# Patient Record
Sex: MALE | Race: BLACK OR AFRICAN AMERICAN
[De-identification: names, ages, dates, MRNs, and addresses within clinical notes are randomized per-mention and may not be internally consistent; named-entity substitution may affect disease eponyms.]

---

## 2017-02-04 ENCOUNTER — HOSPITAL ENCOUNTER (EMERGENCY)
Dept: HOSPITAL 17 - NEPC | Age: 47
LOS: 1 days | Discharge: HOME | End: 2017-02-05
Payer: MEDICARE

## 2017-02-04 VITALS
TEMPERATURE: 98.1 F | HEART RATE: 104 BPM | OXYGEN SATURATION: 96 % | RESPIRATION RATE: 18 BRPM | SYSTOLIC BLOOD PRESSURE: 159 MMHG | DIASTOLIC BLOOD PRESSURE: 91 MMHG

## 2017-02-04 VITALS
OXYGEN SATURATION: 97 % | RESPIRATION RATE: 20 BRPM | SYSTOLIC BLOOD PRESSURE: 183 MMHG | DIASTOLIC BLOOD PRESSURE: 96 MMHG | HEART RATE: 101 BPM

## 2017-02-04 VITALS — WEIGHT: 297.62 LBS | HEIGHT: 71 IN | BODY MASS INDEX: 41.67 KG/M2

## 2017-02-04 VITALS — OXYGEN SATURATION: 97 %

## 2017-02-04 DIAGNOSIS — G40.909: Primary | ICD-10-CM

## 2017-02-04 DIAGNOSIS — I10: ICD-10-CM

## 2017-02-04 LAB
AMPHETAMINE, URINE: (no result)
ANION GAP SERPL CALC-SCNC: 12 MEQ/L (ref 5–15)
BARBITURATES, URINE: (no result)
BASOPHILS # BLD AUTO: 0.1 TH/MM3 (ref 0–0.2)
BASOPHILS NFR BLD: 0.7 % (ref 0–2)
BUN SERPL-MCNC: 10 MG/DL (ref 7–18)
CHLORIDE SERPL-SCNC: 102 MEQ/L (ref 98–107)
COCAINE UR-MCNC: (no result) NG/ML
EOSINOPHIL # BLD: 0.1 TH/MM3 (ref 0–0.4)
EOSINOPHIL NFR BLD: 0.9 % (ref 0–4)
ERYTHROCYTE [DISTWIDTH] IN BLOOD BY AUTOMATED COUNT: 14.5 % (ref 11.6–17.2)
GFR SERPLBLD BASED ON 1.73 SQ M-ARVRAT: 118 ML/MIN (ref 89–?)
HCO3 BLD-SCNC: 20.6 MEQ/L (ref 21–32)
HCT VFR BLD CALC: 42.1 % (ref 39–51)
HEMO FLAGS: (no result)
LYMPHOCYTES # BLD AUTO: 3.8 TH/MM3 (ref 1–4.8)
LYMPHOCYTES NFR BLD AUTO: 38.5 % (ref 9–44)
MCH RBC QN AUTO: 26.4 PG (ref 27–34)
MCHC RBC AUTO-ENTMCNC: 34.6 % (ref 32–36)
MCV RBC AUTO: 76.2 FL (ref 80–100)
MONOCYTES NFR BLD: 10.7 % (ref 0–8)
NEUTROPHILS # BLD AUTO: 4.9 TH/MM3 (ref 1.8–7.7)
NEUTROPHILS NFR BLD AUTO: 49.2 % (ref 16–70)
PLATELET # BLD: 204 TH/MM3 (ref 150–450)
POTASSIUM SERPL-SCNC: 3.6 MEQ/L (ref 3.5–5.1)
RBC # BLD AUTO: 5.53 MIL/MM3 (ref 4.5–5.9)
SODIUM SERPL-SCNC: 135 MEQ/L (ref 136–145)
WBC # BLD AUTO: 9.9 TH/MM3 (ref 4–11)

## 2017-02-04 PROCEDURE — 96365 THER/PROPH/DIAG IV INF INIT: CPT

## 2017-02-04 PROCEDURE — 80307 DRUG TEST PRSMV CHEM ANLYZR: CPT

## 2017-02-04 PROCEDURE — 80185 ASSAY OF PHENYTOIN TOTAL: CPT

## 2017-02-04 PROCEDURE — 99284 EMERGENCY DEPT VISIT MOD MDM: CPT

## 2017-02-04 PROCEDURE — 96375 TX/PRO/DX INJ NEW DRUG ADDON: CPT

## 2017-02-04 PROCEDURE — 80320 DRUG SCREEN QUANTALCOHOLS: CPT

## 2017-02-04 PROCEDURE — 70450 CT HEAD/BRAIN W/O DYE: CPT

## 2017-02-04 PROCEDURE — 96361 HYDRATE IV INFUSION ADD-ON: CPT

## 2017-02-04 PROCEDURE — 80048 BASIC METABOLIC PNL TOTAL CA: CPT

## 2017-02-04 PROCEDURE — 85025 COMPLETE CBC W/AUTO DIFF WBC: CPT

## 2017-02-04 PROCEDURE — 80184 ASSAY OF PHENOBARBITAL: CPT

## 2017-02-04 NOTE — RADRPT
EXAM DATE/TIME:  02/04/2017 23:39 

 

[facility] COMPARISON:     

CT BRAIN W/O CONTRAST, January 05, 2016, 21:11.

 

 

INDICATIONS :     

Seizure.

                      

 

RADIATION DOSE:     

50.30 CTDIvol (mGy) 

 

 

 

MEDICAL HISTORY :     

Hypertension. Seizures. 

 

SURGICAL HISTORY :      

None. 

 

ENCOUNTER:      

Initial

 

ACUITY:      

1 day

 

PAIN SCALE:      

0/10

 

LOCATION:        

cranial 

 

TECHNIQUE:     

Multiple contiguous axial images were obtained of the head.  Using automated exposure control and adj
ustment of the mA and/or kV according to patient size, radiation dose was kept as low as reasonably a
chievable to obtain optimal diagnostic quality images. 

 

        FINDINGS:

 

CEREBRUM:     

The ventricles are normal.  No evidence of midline shift, mass lesion, hemorrhage or acute infarction
.  No extra-axial fluid collections are seen.

 

POSTERIOR FOSSA:     

The cerebellum and brainstem demonstrate no abnormality.  The 4th ventricle is midline.  The cerebell
opontine angle is unremarkable.

 

EXTRACRANIAL:     

Visualized sinuses are clear.

 

SKULL:     

The calvaria is intact.  No evidence of skull fracture.

 

CONCLUSION:     

Stable noncontrast head CT. No acute finding is identified.

 

 

 

 Hernán Redd MD on February 04, 2017 at 23:45           

Board Certified Radiologist.

 This report was verified electronically.

## 2017-02-05 VITALS — SYSTOLIC BLOOD PRESSURE: 143 MMHG | DIASTOLIC BLOOD PRESSURE: 80 MMHG

## 2017-02-05 VITALS
SYSTOLIC BLOOD PRESSURE: 172 MMHG | RESPIRATION RATE: 20 BRPM | OXYGEN SATURATION: 99 % | DIASTOLIC BLOOD PRESSURE: 74 MMHG | HEART RATE: 90 BPM

## 2017-02-05 VITALS
OXYGEN SATURATION: 95 % | RESPIRATION RATE: 20 BRPM | DIASTOLIC BLOOD PRESSURE: 86 MMHG | SYSTOLIC BLOOD PRESSURE: 152 MMHG | HEART RATE: 102 BPM

## 2017-02-05 NOTE — PD
HPI


Chief Complaint:  Seizure


Time Seen by Provider:  22:46


Travel History


International Travel<30 days:  No


Contact w/Intl Traveler<30days:  No


Traveled to known affect area:  No





History of Present Illness


HPI


46-year-old male presents status post witnessed generalized tonic-clonic 

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  

Patient here states that he bit his tongue and has history of seizure disorder.

  He denies specific complaints.  He states he takes Dilantin and another 

medication and has been taking them like he should for his seizures.  History 

is initially limited as patient can only give me basic details.





PFSH


Past Medical History


Hx Anticoagulant Therapy:  No


Blood Disorders:  No


Cardiovascular Problems:  No


Chemotherapy:  No


Cerebrovascular Accident:  No


Diabetes:  No


Diminished Hearing:  No


Hypertension:  Yes


Respiratory:  No


Seizures:  Yes


Influenza Vaccination:  No





Past Surgical History


Surgical History:  No Previous Surgery





Social History


Alcohol Use:  No


Tobacco Use:  No


Substance Use:  No





Allergies-Medications


(Allergen,Severity, Reaction):  


Coded Allergies:  


     No Known Allergies (Verified , 2/4/17)


Reported Meds & Prescriptions





Reported Meds & Active Scripts


Active


Reported


[Metoprolol Tartrate ]     


[Dilantin]     








Review of Systems


ROS Limitations:  Poor Historian


Except as stated in HPI:  all other systems reviewed are Neg





Physical Exam


Narrative


GENERAL: Well-nourished, well-developed patient.


SKIN: Warm and dry.


HEAD: Normocephalic and hematoma noted to forehead


EYES: No injection or drainage. 


ENT: No nasal drainage noted.  Simple small laceration noted to right side of 

tongue


NECK: Supple, trachea midline.  Nontender in midline


CARDIOVASCULAR: Regular rate and rhythm 


RESPIRATORY: Breath sounds equal bilaterally. No accessory muscle use.


GASTROINTESTINAL: Abdomen soft, non-tender, nondistended. 


EXTREMITIES: No specific joint pain


NEUROLOGICAL: Awake and alert to name. Motor and sensory grossly within normal 

limits. Normal speech.





Data


Data


Last Documented VS





Vital Signs








  Date Time  Temp Pulse Resp B/P Pulse Ox O2 Delivery O2 Flow Rate FiO2


 


2/5/17 02:17  100 20 143/80 93   


 


2/5/17 00:55      Nasal Cannula  


 


2/4/17 23:15       3 


 


2/4/17 22:48 98.1       








Orders





 Complete Blood Count With Diff (2/4/17 22:47)


Basic Metabolic Panel (Bmp) (2/4/17 22:47)


Alcohol (Ethanol) (2/4/17 22:47)


Phenytoin (Dilantin) (2/4/17 22:47)


Drug Screen, Random Urine (2/4/17 22:47)


Ct Brain W/O Iv Contrast(Rout) (2/4/17 )


Blood Glucose (2/4/17 22:47)


Ecg Monitoring (2/4/17 22:47)


Iv Access Insert/Monitor (2/4/17 22:47)


Oximetry (2/4/17 22:47)


Sodium Chlor 0.9% 1000 Ml Inj (Ns 1000 M (2/4/17 22:47)


Sodium Chloride 0.9% Flush (Ns Flush) (2/4/17 23:00)


Lorazepam Inj (Ativan Inj) (2/4/17 23:00)


Phenobarbital (2/4/17 23:58)


Fosphenytoin Inj (Cerebyx Inj) (2/5/17 00:15)





Labs





 Laboratory Tests








Test 2/4/17





 23:20


 


White Blood Count 9.9 TH/MM3


 


Red Blood Count 5.53 MIL/MM3


 


Hemoglobin 14.6 GM/DL


 


Hematocrit 42.1 %


 


Mean Corpuscular Volume 76.2 FL


 


Mean Corpuscular Hemoglobin 26.4 PG


 


Mean Corpuscular Hemoglobin 34.6 %





Concent 


 


Red Cell Distribution Width 14.5 %


 


Platelet Count 204 TH/MM3


 


Mean Platelet Volume 8.2 FL


 


Neutrophils (%) (Auto) 49.2 %


 


Lymphocytes (%) (Auto) 38.5 %


 


Monocytes (%) (Auto) 10.7 %


 


Eosinophils (%) (Auto) 0.9 %


 


Basophils (%) (Auto) 0.7 %


 


Neutrophils # (Auto) 4.9 TH/MM3


 


Lymphocytes # (Auto) 3.8 TH/MM3


 


Monocytes # (Auto) 1.1 TH/MM3


 


Eosinophils # (Auto) 0.1 TH/MM3


 


Basophils # (Auto) 0.1 TH/MM3


 


CBC Comment DIFF FINAL 


 


Differential Comment  


 


Sodium Level 135 MEQ/L


 


Potassium Level 3.6 MEQ/L


 


Chloride Level 102 MEQ/L


 


Carbon Dioxide Level 20.6 MEQ/L


 


Anion Gap 12 MEQ/L


 


Blood Urea Nitrogen 10 MG/DL


 


Creatinine 0.84 MG/DL


 


Estimat Glomerular Filtration 118 ML/MIN





Rate 


 


Random Glucose 109 MG/DL


 


Calcium Level 8.3 MG/DL


 


Urine Opiates Screen NEG 


 


Urine Barbiturates Screen POS 


 


Phenytoin (Dilantin) Level 3.8 MCG/ML


 


Urine Amphetamines Screen NEG 


 


Urine Benzodiazepines Screen NEG 


 


Urine Cocaine Screen NEG 


 


Urine Cannabinoids Screen NEG 


 


Ethyl Alcohol Level LESS THAN 3





 MG/DL


 


Phenobarbital Level 14.9 MCG/ML











MDM


Medical Decision Making


Medical Screen Exam Complete:  Yes


Emergency Medical Condition:  Yes


Medical Record Reviewed:  Yes (past history confirmed)


Interpretation(s)


CBC & BMP Diagram


2/4/17 23:20








Dilantin level is low


ct brain no acute


Differential Diagnosis


Subtherapeutic seizure medication, hyponatremia, intercranial, hypoglycemia


Narrative Course


Will check blood work, CT brain and reevaluate after one of Ativan to decrease 

seizure threshold





Cerebyx level is low will dose with 1 g of Cerebyx and monitor





0013 before patient could get Cerebyx he had a generalized tonic-clonic seizure 

here





no seizure here after loaded with cerebyx, Patient denies any new complaints 

and states that they are feeling better, all questions answered. Patient knows 

that follow up is incumbent on them and to return to the emergency room 

immediately if new or worsening symptoms develop. Patient given strict return 

precautions, vitals reviewed and are normal, agrees to further workup as an 

outpatient.





Diagnosis





 Primary Impression:  


 Seizure


Referrals:  


Neurologist


call for appointment


monday


Patient Instructions:  General Instructions





***Additional Instructions:


take your seizure medication as directed, your seizure levels were low and you 

were given extra in the ER, return as needed


***Med/Other Pt SpecificInfo:  No Change to Meds


Disposition:  01 DISCHARGE HOME


Condition:  Stable








Monique Baeza MD Feb 5, 2017 01:32

## 2017-02-19 ENCOUNTER — HOSPITAL ENCOUNTER (INPATIENT)
Dept: HOSPITAL 17 - NEPA | Age: 47
LOS: 1 days | Discharge: HOME | DRG: 101 | End: 2017-02-20
Attending: HOSPITALIST | Admitting: HOSPITALIST
Payer: MEDICARE

## 2017-02-19 VITALS
OXYGEN SATURATION: 93 % | SYSTOLIC BLOOD PRESSURE: 143 MMHG | DIASTOLIC BLOOD PRESSURE: 85 MMHG | RESPIRATION RATE: 18 BRPM | TEMPERATURE: 98.7 F | HEART RATE: 95 BPM

## 2017-02-19 VITALS
SYSTOLIC BLOOD PRESSURE: 144 MMHG | HEART RATE: 100 BPM | OXYGEN SATURATION: 100 % | RESPIRATION RATE: 16 BRPM | DIASTOLIC BLOOD PRESSURE: 88 MMHG

## 2017-02-19 VITALS
OXYGEN SATURATION: 100 % | RESPIRATION RATE: 20 BRPM | HEART RATE: 74 BPM | DIASTOLIC BLOOD PRESSURE: 65 MMHG | SYSTOLIC BLOOD PRESSURE: 118 MMHG

## 2017-02-19 VITALS
HEART RATE: 86 BPM | DIASTOLIC BLOOD PRESSURE: 89 MMHG | TEMPERATURE: 98.3 F | RESPIRATION RATE: 22 BRPM | OXYGEN SATURATION: 96 % | SYSTOLIC BLOOD PRESSURE: 188 MMHG

## 2017-02-19 VITALS
RESPIRATION RATE: 18 BRPM | DIASTOLIC BLOOD PRESSURE: 84 MMHG | OXYGEN SATURATION: 98 % | SYSTOLIC BLOOD PRESSURE: 171 MMHG | HEART RATE: 88 BPM | TEMPERATURE: 98.7 F

## 2017-02-19 VITALS — BODY MASS INDEX: 46.14 KG/M2 | WEIGHT: 304.46 LBS | HEIGHT: 68 IN

## 2017-02-19 VITALS — OXYGEN SATURATION: 95 %

## 2017-02-19 DIAGNOSIS — I10: ICD-10-CM

## 2017-02-19 DIAGNOSIS — E66.9: ICD-10-CM

## 2017-02-19 DIAGNOSIS — Z87.891: ICD-10-CM

## 2017-02-19 DIAGNOSIS — E87.6: ICD-10-CM

## 2017-02-19 DIAGNOSIS — G40.419: Primary | ICD-10-CM

## 2017-02-19 DIAGNOSIS — Z91.14: ICD-10-CM

## 2017-02-19 LAB
ALP SERPL-CCNC: 138 U/L (ref 45–117)
ALT SERPL-CCNC: 37 U/L (ref 12–78)
AMPHETAMINE, URINE: (no result)
ANION GAP SERPL CALC-SCNC: 13 MEQ/L (ref 5–15)
AST SERPL-CCNC: 35 U/L (ref 15–37)
BARBITURATES, URINE: (no result)
BASOPHILS # BLD AUTO: 0 TH/MM3 (ref 0–0.2)
BASOPHILS NFR BLD: 0.6 % (ref 0–2)
BILIRUB SERPL-MCNC: 0.3 MG/DL (ref 0.2–1)
BUN SERPL-MCNC: 9 MG/DL (ref 7–18)
CHLORIDE SERPL-SCNC: 104 MEQ/L (ref 98–107)
COCAINE UR-MCNC: (no result) NG/ML
COLOR UR: (no result)
EOSINOPHIL # BLD: 0.2 TH/MM3 (ref 0–0.4)
EOSINOPHIL NFR BLD: 2.2 % (ref 0–4)
ERYTHROCYTE [DISTWIDTH] IN BLOOD BY AUTOMATED COUNT: 14.5 % (ref 11.6–17.2)
GFR SERPLBLD BASED ON 1.73 SQ M-ARVRAT: 108 ML/MIN (ref 89–?)
GLUCOSE UR STRIP-MCNC: (no result) MG/DL
HCO3 BLD-SCNC: 20.6 MEQ/L (ref 21–32)
HCT VFR BLD CALC: 40.7 % (ref 39–51)
HEMO FLAGS: (no result)
HGB UR QL STRIP: (no result)
KETONES UR STRIP-MCNC: (no result) MG/DL
LYMPHOCYTES # BLD AUTO: 1.4 TH/MM3 (ref 1–4.8)
LYMPHOCYTES NFR BLD AUTO: 18.4 % (ref 9–44)
MCH RBC QN AUTO: 26.3 PG (ref 27–34)
MCHC RBC AUTO-ENTMCNC: 34.9 % (ref 32–36)
MCV RBC AUTO: 75.3 FL (ref 80–100)
MONOCYTES NFR BLD: 8.6 % (ref 0–8)
MUCOUS THREADS #/AREA URNS LPF: (no result) /LPF
NEUTROPHILS # BLD AUTO: 5.5 TH/MM3 (ref 1.8–7.7)
NEUTROPHILS NFR BLD AUTO: 70.2 % (ref 16–70)
NITRITE UR QL STRIP: (no result)
PLATELET # BLD: 188 TH/MM3 (ref 150–450)
POTASSIUM SERPL-SCNC: 3.4 MEQ/L (ref 3.5–5.1)
RBC # BLD AUTO: 5.4 MIL/MM3 (ref 4.5–5.9)
SODIUM SERPL-SCNC: 138 MEQ/L (ref 136–145)
SP GR UR STRIP: 1.01 (ref 1–1.03)
SQUAMOUS #/AREA URNS HPF: <1 /HPF (ref 0–5)
TRANS CELLS #/AREA URNS HPF: <1 /HPF
WBC # BLD AUTO: 7.8 TH/MM3 (ref 4–11)

## 2017-02-19 PROCEDURE — 85025 COMPLETE CBC W/AUTO DIFF WBC: CPT

## 2017-02-19 PROCEDURE — 81001 URINALYSIS AUTO W/SCOPE: CPT

## 2017-02-19 PROCEDURE — 83735 ASSAY OF MAGNESIUM: CPT

## 2017-02-19 PROCEDURE — 93005 ELECTROCARDIOGRAM TRACING: CPT

## 2017-02-19 PROCEDURE — 96365 THER/PROPH/DIAG IV INF INIT: CPT

## 2017-02-19 PROCEDURE — 80185 ASSAY OF PHENYTOIN TOTAL: CPT

## 2017-02-19 PROCEDURE — 80184 ASSAY OF PHENOBARBITAL: CPT

## 2017-02-19 PROCEDURE — 80307 DRUG TEST PRSMV CHEM ANLYZR: CPT

## 2017-02-19 PROCEDURE — 96375 TX/PRO/DX INJ NEW DRUG ADDON: CPT

## 2017-02-19 PROCEDURE — 80053 COMPREHEN METABOLIC PANEL: CPT

## 2017-02-19 PROCEDURE — 96361 HYDRATE IV INFUSION ADD-ON: CPT

## 2017-02-19 RX ADMIN — PHENYTOIN SODIUM SCH MG: 50 INJECTION INTRAMUSCULAR; INTRAVENOUS at 22:20

## 2017-02-19 RX ADMIN — METOPROLOL TARTRATE SCH MG: 50 TABLET, FILM COATED ORAL at 20:03

## 2017-02-19 NOTE — PD
HPI


Chief Complaint:  Seizure


Time Seen by Provider:  11:38


Travel History


International Travel<30 days:  No


Contact w/Intl Traveler<30days:  No


Traveled to known affect area:  No





History of Present Illness


HPI


Patient is a 46-year-old male with history of grand mal seizures currently 

taking Dilantin for seizures, presents to emergency room after he had a 

witnessed seizure at [employer] today.  EMS unsure how long seizure lasted or 

whether patient had any trauma to his head.  Patient was post ictal prior on 

route to the emergency room, patient reports that he has been compliant with 

his medications.  Patient reports that he last had a seizure about 3-4 months 

ago and last saw his neurologist about 5 months ago.  Patient reports that when 

he last saw his neurologist, there is no medication changes.  Patient reports 

that he feels fine now, denies headache or dizziness at this time.  Patient 

denies chest pain or shortness of breath.  Patient with no complaints.





PFSH


Past Medical History


Hx Anticoagulant Therapy:  No


Blood Disorders:  No


Cardiovascular Problems:  No


Chemotherapy:  No


Cerebrovascular Accident:  No


Diabetes:  No


Diminished Hearing:  No


Hypertension:  Yes


Respiratory:  No


Seizures:  Yes


Influenza Vaccination:  No





Past Surgical History


Surgical History:  No Previous Surgery





Family History


Family History:  Negative





Social History


Alcohol Use:  No


Tobacco Use:  No


Substance Use:  No





Allergies-Medications


(Allergen,Severity, Reaction):  


Coded Allergies:  


     No Known Allergies (Verified , 2/19/17)


Reported Meds & Prescriptions





Reported Meds & Active Scripts


Active


Reported


[Metoprolol Tartrate ]     


[Dilantin]     








Review of Systems


General / Constitutional:  No: Fever


Eyes:  No: Visual changes


HENT:  No: Headaches


Cardiovascular:  No: Chest Pain or Discomfort


Respiratory:  No: Shortness of Breath


Gastrointestinal:  No: Abdominal Pain


Genitourinary:  No: Dysuria


Musculoskeletal:  No: Pain


Skin:  No Rash


Neurologic:  Positive: Seizures,  No: Weakness


Psychiatric:  No: Depression


Endocrine:  No: Polydipsia


Hematologic/Lymphatic:  No: Easy Bruising





Physical Exam


Narrative


GENERAL: No acute distress, nontoxic


SKIN: Warm and dry.


HEAD: Atraumatic. Normocephalic. 


EYES: Pupils equal and round. No scleral icterus. No injection or drainage. 


ENT: No nasal bleeding or discharge.  Mucous membranes pink and moist.


NECK: Trachea midline. No JVD. 


CARDIOVASCULAR: Regular rate and rhythm.  No murmur appreciated.


RESPIRATORY: No accessory muscle use. Clear to auscultation. Breath sounds 

equal bilaterally. 


GASTROINTESTINAL: Abdomen soft, non-tender, nondistended. Hepatic and splenic 

margins not palpable. 


MUSCULOSKELETAL: No obvious deformities. No clubbing.  No cyanosis.  No edema. 


NEUROLOGICAL: Awake and alert. No obvious cranial nerve deficits.  Motor 

grossly within normal limits. Normal speech.  Cranial nerves to 12 grossly 

intact without any obvious neurological deficits


PSYCHIATRIC: Appropriate mood and affect; insight and judgment normal.





Data


Data


Last Documented VS





Vital Signs








  Date Time  Temp Pulse Resp B/P Pulse Ox O2 Delivery O2 Flow Rate FiO2


 


2/19/17 13:13  100 16 144/88 100 Partial Rebreather  


 


2/19/17 13:12       15 


 


2/19/17 11:27 98.7       








Orders





 Electrocardiogram (2/19/17 )


Complete Blood Count With Diff (2/19/17 11:44)


Phenytoin (Dilantin) (2/19/17 11:44)


Drug Screen, Random Urine (2/19/17 11:44)


Ecg Monitoring (2/19/17 11:44)


Iv Access Insert/Monitor (2/19/17 11:44)


Oximetry (2/19/17 11:44)


Comprehensive Metabolic Panel (2/19/17 11:44)


Sodium Chlor 0.9% 1000 Ml Inj (Ns 1000 M (2/19/17 11:44)


Sodium Chloride 0.9% Flush (Ns Flush) (2/19/17 11:45)


Ua Includes Microscopic (2/19/17 11:44)


Phenytoin Inj (Dilantin Inj) (2/19/17 12:45)


Lorazepam Inj (Ativan Inj) (2/19/17 12:58)


Lorazepam Inj (Ativan Inj) (2/19/17 13:15)





Labs





 Laboratory Tests








Test 2/19/17 2/19/17





 11:49 12:37


 


White Blood Count 7.8 TH/MM3 


 


Red Blood Count 5.40 MIL/MM3 


 


Hemoglobin 14.2 GM/DL 


 


Hematocrit 40.7 % 


 


Mean Corpuscular Volume 75.3 FL 


 


Mean Corpuscular Hemoglobin 26.3 PG 


 


Mean Corpuscular Hemoglobin 34.9 % 





Concent  


 


Red Cell Distribution Width 14.5 % 


 


Platelet Count 188 TH/MM3 


 


Mean Platelet Volume 7.8 FL 


 


Neutrophils (%) (Auto) 70.2 % 


 


Lymphocytes (%) (Auto) 18.4 % 


 


Monocytes (%) (Auto) 8.6 % 


 


Eosinophils (%) (Auto) 2.2 % 


 


Basophils (%) (Auto) 0.6 % 


 


Neutrophils # (Auto) 5.5 TH/MM3 


 


Lymphocytes # (Auto) 1.4 TH/MM3 


 


Monocytes # (Auto) 0.7 TH/MM3 


 


Eosinophils # (Auto) 0.2 TH/MM3 


 


Basophils # (Auto) 0.0 TH/MM3 


 


CBC Comment DIFF FINAL  


 


Differential Comment   


 


Sodium Level 138 MEQ/L 


 


Potassium Level 3.4 MEQ/L 


 


Chloride Level 104 MEQ/L 


 


Carbon Dioxide Level 20.6 MEQ/L 


 


Anion Gap 13 MEQ/L 


 


Blood Urea Nitrogen 9 MG/DL 


 


Creatinine 0.91 MG/DL 


 


Estimat Glomerular Filtration 108 ML/MIN 





Rate  


 


Random Glucose 113 MG/DL 


 


Calcium Level 8.3 MG/DL 


 


Total Bilirubin 0.3 MG/DL 


 


Aspartate Amino Transf 35 U/L 





(AST/SGOT)  


 


Alanine Aminotransferase 37 U/L 





(ALT/SGPT)  


 


Alkaline Phosphatase 138 U/L 


 


Total Protein 8.9 GM/DL 


 


Albumin 3.4 GM/DL 


 


Phenytoin (Dilantin) Level 5.8 MCG/ML 


 


Urine Color  LIGHT-YELLOW 


 


Urine Turbidity  CLEAR 


 


Urine pH  6.5 


 


Urine Specific Gravity  1.011 


 


Urine Protein  30 mg/dL


 


Urine Glucose (UA)  NEG mg/dL


 


Urine Ketones  NEG mg/dL


 


Urine Occult Blood  TRACE 


 


Urine Nitrite  NEG 


 


Urine Bilirubin  NEG 


 


Urine Urobilinogen  LESS THAN 2.0





  MG/DL


 


Urine Leukocyte Esterase  NEG 


 


Urine RBC  LESS THAN 1





  /hpf


 


Urine WBC  LESS THAN 1





  /hpf


 


Urine Squamous Epithelial  <1 /hpf





Cells  


 


Urine Transitional Epithelial  <1 /hpf





Cells  


 


Urine Mucus  FEW /lpf











MDM


Medical Decision Making


Medical Screen Exam Complete:  Yes


Emergency Medical Condition:  Yes


Interpretation(s)


EKG at 1136: Normal sinus rhythm at 80 beats for minute, QT//411, 

nonspecific T-wave changes








Vital Signs








  Date Time  Temp Pulse Resp B/P Pulse Ox O2 Delivery O2 Flow Rate FiO2


 


2/19/17 11:27 98.7 95 18 143/85 93   








Differential Diagnosis


Recurrent seizures, medication noncompliance, electrolyte abnormalities, ACS, 

arrhythmia


Narrative Course


Patient is a 46-year-old male with history of grand mal seizures, presents to 

emergency room with complaints of recurrent seizures.  Patient is currently on 

Dilantin for seizures, reports last seizure was 3-4 months ago.  Patient 

currently with no complaints, had a witnessed seizure while at [employer] today.





Plan to monitor patient, obtain labs and Dilantin level.  Patient currently on 

a cardiac monitor, will continue to monitor him.  Patient does have history of 

medication noncompliance, if the dilantin level is low, we will bolus him with 

Dilantin.





Was called to room as patient began to have a grand mal seizure, seizure lasted 

for less than 1 minute, 2mg of IV ativan given to patient, patient postictal. 

patient with second seizure today, will require observation





case reviewed with dr mcdaniels who accepts pt to service





Diagnosis





 Primary Impression:  


 Recurrent seizures





Admitting Information


Admitting Physician Requests:  Observation








Ronda Conley DO Feb 19, 2017 11:53

## 2017-02-19 NOTE — HHI.HP
__________________________________________________





[facility]


Service


[facility]


Primary Care Physician


No Primary Care Physician


Admission Diagnosis


Recurrent Seizure


Diagnoses:  


Chief Complaint:  


recurrent seizure


Travel History


International Travel<30 Days:  No


Contact w/Intl Traveler <30 Da:  No


Traveled to Known Affected Are:  No


History of Present Illness


46-year-old male with PMH of hypertension, grand mal seizures on Dilantin/

Phenobarbital, and hx of status epilepticus requiring intubation, presents to 

the ER after a witnessed seizure while at [employer] today . It is unknown how 

long the seizure lasted. The patient was post ictal en route to the ER however 

AAOx4 upon arrival. He reports urinary incontinence and tongue biting. He last 

visited the ER on 17 for seizure x2, head CT was unremarkable at that time, 

he was loaded with IV Cerebyx and discharged home. The patient reports 

compliance with his Dilantin however upon arrival Dilantin level subtherapeutic 

at 5.8. He does admit to running out of his Phenobarbital 2-3 days ago; states 

a prescription was called in but he has not picked it up yet. He last saw his 

neurologist 5months ago, he is unsure who his neurologist is (female, located 

on [facility]). The patient was initially feeling back to normal in the ER 

however then had another grand mal seizure witnessed by ER MD, lasted less than 

1 minute, s/p 2mg IV Ativan. The patient is again post ictal, drowsy, falls 

asleep throughout exam however is oriented to person, place, and states the 

date is 17. With intractable seizures and subtherapeutic levels, ER MD 

requested admission. The patient denies any recent fevers/chills, headaches, 

lightheadedness, dizziness, chest pains, palpitations, shortness of breath, 

abdominal or urinary complaints.





Review of Systems


Constitutional:  DENIES: Diaphoretic episodes, Fever, Chills, Dizziness


Endocrine:  DENIES: Polydipsia, Polyuria, Polyphagia


Eyes:  DENIES: Blurred vision, Vision loss, Double Vision


Ears, nose, mouth, throat:  DENIES: Throat pain, Hoarseness, Odynophagia


Respiratory:  DENIES: Cough, Shortness of breath


Cardiovascular:  DENIES: Chest pain, Palpitations, Dyspnea on Exertion


Gastrointestinal:  DENIES: Abdominal pain, Diarrhea, Nausea, Vomiting


Genitourinary:  COMPLAINS OF: Urinary incontinence,  DENIES: Urgency, Dysuria


Musculoskeletal:  DENIES: Back pain, Neck pain


Integumentary:  DENIES: Pruritus, Rash


Hematologic/lymphatic:  DENIES: Bruising, Lymphadenopathy


Immunologic/allergic:  DENIES: Eczema, Urticaria


Neurologic:  COMPLAINS OF: Seizures,  DENIES: Abnormal gait, Headache, 

Localized weakness, Paresthesias


Psychiatric:  DENIES: Anxiety, Depression





Past Family Social History


Past Medical History


hypertension


grand mal seizures


hx of status epilepticus requiring intubation


Past Surgical History


Denies any prior surgeries.


Reported Medications


Metoprolol (unknown dose)


Dilantin (patient believes 100mg bid)


Phenobarbital (patient also believes this is 100mg bid)


Asked RN to update med rec prior to restarting home meds.


Allergies:  


Coded Allergies:  


     No Known Allergies (Verified , 17)


Active Ordered Medications





 Current Medications








 Medications


  (Trade)  Dose


 Ordered  Sig/Justus


 Route  Start Time


 Stop Time Status Last Admin


 


  (NS Flush)  2 ml  UNSCH  PRN


 IVF  17 14:30


     


 


 


  (NS Flush)  2 ml  BID


 IVF  17 21:00


     


 


 


  (Ativan Inj)  2 mg  Q10M  PRN


 IV  17 14:30


     


 


 


  (Tylenol)  650 mg  Q4H  PRN


 PO  17 14:30


     


 


 


 Phenytoin Sodium


 100 mg  100 mg  Q8HR


 IV  17 22:00


     


 


 


  (KCl 20 Meq


 Premix Inj)  100 ml @ 


 50 mls/hr  BOLUS  ONCE


 IV  17 15:00


 17 16:59  17 15:04


 


 


  (Luminal Inj)  130 mg  ONCE  ONCE


 IM  17 15:30


 17 15:31 UNV  


 








Family History


Both parents are , unknown medical problems


Denies any family history of seizures


Social History


Smoked tobacco 1PPD for 10+ years, quit in his late 30s


Denies any alcohol or illicit drug use





Physical Exam


Vital Signs





 Vital Signs








  Date Time  Temp Pulse Resp B/P Pulse Ox O2 Delivery O2 Flow Rate FiO2


 


17 13:13  100 16 144/88 100 Partial Rebreather  


 


17 13:12     100 Non-Rebreather 15 


 


17 11:46     95 Nasal Cannula 2 


 


17 11:27 98.7 95 18 143/85 93   








Physical Exam


GENERAL: Well-nourished, well-developed obese AA male patient in NAD. Drowsy, 

however easily awakens to voice, is AAOx4. 


SKIN: Warm and dry. No rash.


HEAD:  Normocephalic. Atraumatic.


EYES: Pupils equal and round. No scleral icterus. No injection or drainage. 


ENT: No nasal bleeding or discharge.  Mucous membranes pink and moist. Tongue 

with bite marks/abrasion/ecchymosis on both sides, no active bleeding. 


NECK: Supple. Trachea midline. 


CARDIOVASCULAR: Regular rate and rhythm.  S1, S2 noted. No murmur appreciated. 


RESPIRATORY: No accessory muscle use. Clear to auscultation. Breath sounds 

equal bilaterally.  


GASTROINTESTINAL: Abdomen soft, non-tender, nondistended. Normoactive bowel 

sounds x4.


MUSCULOSKELETAL: No obvious deformities. Extremities without clubbing, cyanosis

, or edema. 


NEUROLOGICAL: Awake and alert. No obvious cranial nerve deficits.  Motor 

grossly within normal limits. Moves all extremities spontaneously.  Normal 

speech.


PSYCHIATRIC: Appropriate mood and affect; insight and judgment normal.


Laboratory





Laboratory Tests








Test 17





 11:49 12:37


 


White Blood Count 7.8  


 


Red Blood Count 5.40  


 


Hemoglobin 14.2  


 


Hematocrit 40.7  


 


Mean Corpuscular Volume 75.3  


 


Mean Corpuscular Hemoglobin 26.3  


 


Mean Corpuscular Hemoglobin 34.9  





Concent  


 


Red Cell Distribution Width 14.5  


 


Platelet Count 188  


 


Mean Platelet Volume 7.8  


 


Neutrophils (%) (Auto) 70.2  


 


Lymphocytes (%) (Auto) 18.4  


 


Monocytes (%) (Auto) 8.6  


 


Eosinophils (%) (Auto) 2.2  


 


Basophils (%) (Auto) 0.6  


 


Neutrophils # (Auto) 5.5  


 


Lymphocytes # (Auto) 1.4  


 


Monocytes # (Auto) 0.7  


 


Eosinophils # (Auto) 0.2  


 


Basophils # (Auto) 0.0  


 


CBC Comment DIFF FINAL  


 


Differential Comment   


 


Sodium Level 138  


 


Potassium Level 3.4  


 


Chloride Level 104  


 


Carbon Dioxide Level 20.6  


 


Anion Gap 13  


 


Blood Urea Nitrogen 9  


 


Creatinine 0.91  


 


Estimat Glomerular Filtration 108  





Rate  


 


Random Glucose 113  


 


Calcium Level 8.3  


 


Total Bilirubin 0.3  


 


Aspartate Amino Transf 35  





(AST/SGOT)  


 


Alanine Aminotransferase 37  





(ALT/SGPT)  


 


Alkaline Phosphatase 138  


 


Total Protein 8.9  


 


Albumin 3.4  


 


Phenytoin (Dilantin) Level 5.8  


 


Urine Color  LIGHT-YELLOW 


 


Urine Turbidity  CLEAR 


 


Urine pH  6.5 


 


Urine Specific Gravity  1.011 


 


Urine Protein  30 


 


Urine Glucose (UA)  NEG 


 


Urine Ketones  NEG 


 


Urine Occult Blood  TRACE 


 


Urine Nitrite  NEG 


 


Urine Bilirubin  NEG 


 


Urine Urobilinogen  LESS THAN 2.0 


 


Urine Leukocyte Esterase  NEG 


 


Urine RBC  LESS THAN 1 


 


Urine WBC  LESS THAN 1 


 


Urine Squamous Epithelial  <1 





Cells  


 


Urine Transitional Epithelial  <1 





Cells  


 


Urine Mucus  FEW 


 


Urine Opiates Screen  NEG 


 


Urine Barbiturates Screen  POS 


 


Urine Amphetamines Screen  NEG 


 


Urine Benzodiazepines Screen  NEG 


 


Urine Cocaine Screen  NEG 


 


Urine Cannabinoids Screen  NEG 








Result Diagram:  


17 1149                                                                   

             17 1149





Imaging


17 - Head CT images reviewed, unremarkable.





Assessment and Plan


Problem List:  


(1) Recurrent seizures


ICD Code:  G40.909


Status:  Acute


Assessment and Plan


46-year-old male with PMH of hypertension, grand mal seizures on Dilantin/

Phenobarbital, and hx of status epilepticus requiring intubation, presents to 

the ER after a witnessed seizure while at [employer] today  and again another 

grand mal seizure witnessed in the ER. 





Recurrent Grand Mal Seizures: Failed Outpatient, seen in the ER 17 for 

seizure x2, loaded with IV Cerebyx, Head CT unremarkable at that time, he was 

discharged home. Recurrent seizure likely secondary to subtherapeutic Dilantin 

level 5.8 and ran out of phenobarbital 2-3days ago. Started on IV Dilantin 

100mg q8h. Loaded with phenobarbital 130mg IM x1.  IV Ativan 2mg prn seizure. 

Seizure precautions. Repeat dilantin and phenobarbital level in the am. 

Outpatient f/up with neurologist. 





Hypertension: chronic, on metoprolol however dose unknown, asked RN to update 

med rec. Monitor BP, adjust antihypertensives as needed. 





Hypokalemia: K 3.4, given IV KCl 20meq replacement. Check mag. Repeat BMP 

tomorrow. 





DVT Prophylaxis: teds/SCDs





Written by Fifi Ng, acting as scribe for Dr. Monae on 17 at 15:

25. 


The documentation accurately reflects the work performed face-to-face by me on  at 15:25


Code Status


Full Code


Discussed Condition With


Patient, ER Fifi Vigil PA-C 2017 14:55


Jose Alejandro Monae MD 2017 18:32

## 2017-02-20 VITALS
SYSTOLIC BLOOD PRESSURE: 153 MMHG | OXYGEN SATURATION: 95 % | RESPIRATION RATE: 20 BRPM | TEMPERATURE: 98.8 F | DIASTOLIC BLOOD PRESSURE: 86 MMHG | HEART RATE: 78 BPM

## 2017-02-20 VITALS
SYSTOLIC BLOOD PRESSURE: 112 MMHG | DIASTOLIC BLOOD PRESSURE: 56 MMHG | OXYGEN SATURATION: 95 % | TEMPERATURE: 97.7 F | HEART RATE: 72 BPM | RESPIRATION RATE: 21 BRPM

## 2017-02-20 VITALS
SYSTOLIC BLOOD PRESSURE: 122 MMHG | HEART RATE: 71 BPM | TEMPERATURE: 98.6 F | OXYGEN SATURATION: 97 % | RESPIRATION RATE: 19 BRPM | DIASTOLIC BLOOD PRESSURE: 77 MMHG

## 2017-02-20 VITALS
HEART RATE: 72 BPM | DIASTOLIC BLOOD PRESSURE: 60 MMHG | SYSTOLIC BLOOD PRESSURE: 125 MMHG | TEMPERATURE: 96 F | OXYGEN SATURATION: 94 % | RESPIRATION RATE: 20 BRPM

## 2017-02-20 VITALS — HEART RATE: 75 BPM

## 2017-02-20 LAB
ALP SERPL-CCNC: 128 U/L (ref 45–117)
ALT SERPL-CCNC: 32 U/L (ref 12–78)
ANION GAP SERPL CALC-SCNC: 8 MEQ/L (ref 5–15)
AST SERPL-CCNC: 33 U/L (ref 15–37)
BILIRUB SERPL-MCNC: 0.6 MG/DL (ref 0.2–1)
BUN SERPL-MCNC: 5 MG/DL (ref 7–18)
CHLORIDE SERPL-SCNC: 104 MEQ/L (ref 98–107)
GFR SERPLBLD BASED ON 1.73 SQ M-ARVRAT: 156 ML/MIN (ref 89–?)
HCO3 BLD-SCNC: 24.8 MEQ/L (ref 21–32)
PHENOBARB SERPL-MCNC: 9.3 MCG/ML (ref 15–40)
POTASSIUM SERPL-SCNC: 3.3 MEQ/L (ref 3.5–5.1)
SODIUM SERPL-SCNC: 137 MEQ/L (ref 136–145)

## 2017-02-20 RX ADMIN — PHENYTOIN SODIUM SCH MG: 50 INJECTION INTRAMUSCULAR; INTRAVENOUS at 05:49

## 2017-02-20 RX ADMIN — PHENYTOIN SODIUM SCH MG: 50 INJECTION INTRAMUSCULAR; INTRAVENOUS at 13:47

## 2017-02-20 RX ADMIN — METOPROLOL TARTRATE SCH MG: 50 TABLET, FILM COATED ORAL at 09:59

## 2017-02-20 NOTE — EKG
Date Performed: 02/19/2017       Time Performed: 11:36:25

 

PTAGE:      46 years

 

EKG:      Sinus rhythm 

 

 POSSIBLE RIGHT VENTRICULAR CONDUCTION DELAY NONSPECIFIC T-WAVE ABNORMALITY BORDERLINE ECG

 

PREVIOUS TRACING       : 01/05/2016 21.20

 

DOCTOR:   Roosevelt Mccarthy  Interpretating Date/Time  02/20/2017 14:33:38

## 2017-02-20 NOTE — HHI.DS
__________________________________________________





Discharge Summary


Admission Date


Feb 19, 2017 at 14:43


Discharge Date:  Feb 20, 2017


Admitting Diagnosis


Recurrent Seizure





(1) Recurrent seizures


ICD Code:  G40.909


Diagnosis:  Principal





Procedures


none


Brief History - From Admission


46-year-old male with PMH of hypertension, grand mal seizures on Dilantin/

Phenobarbital, and hx of status epilepticus requiring intubation, presents to 

the ER after a witnessed seizure while at [facility] today 2/19. It is unknown how 

long the seizure lasted. The patient was post ictal en route to the ER however 

AAOx4 upon arrival. He reports urinary incontinence and tongue biting. He last 

visited the ER on 2/4/17 for seizure x2, head CT was unremarkable at that time, 

he was loaded with IV Cerebyx and discharged home. The patient reports 

compliance with his Dilantin however upon arrival Dilantin level subtherapeutic 

at 5.8. He does admit to running out of his Phenobarbital 2-3 days ago; states 

a prescription was called in but he has not picked it up yet. He last saw his 

neurologist 5months ago, he is unsure who his neurologist is (female, located 

on [facility]). The patient was initially feeling back to normal in the ER 

however then had another grand mal seizure witnessed by ER MD, lasted less than 

1 minute, s/p 2mg IV Ativan. The patient is again post ictal, drowsy, falls 

asleep throughout exam however is oriented to person, place, and states the 

date is 2/17/17. With intractable seizures and subtherapeutic levels, ER MD 

requested admission. The patient denies any recent fevers/chills, headaches, 

lightheadedness, dizziness, chest pains, palpitations, shortness of breath, 

abdominal or urinary complaints.


CBC/BMP:  


2/19/17 1149                                                                   

             2/20/17 0718





Significant Findings





Laboratory Tests








Test 2/19/17 2/19/17 2/20/17





 11:49 12:37 07:18


 


Mean Corpuscular Volume 75.3 FL  





 (80.0-100.0)  


 


Mean Corpuscular Hemoglobin 26.3 PG  





 (27.0-34.0)  


 


Neutrophils (%) (Auto) 70.2 %  





 (16.0-70.0)  


 


Monocytes (%) (Auto) 8.6 % (0.0-8.0)  


 


Potassium Level 3.4 MEQ/L  3.3 MEQ/L





 (3.5-5.1)  (3.5-5.1)


 


Carbon Dioxide Level 20.6 MEQ/L  





 (21.0-32.0)  


 


Random Glucose 113 MG/DL  





 ()  


 


Calcium Level 8.3 MG/DL  8.4 MG/DL





 (8.5-10.1)  (8.5-10.1)


 


Alkaline Phosphatase 138 U/L  128 U/L





 ()  ()


 


Total Protein 8.9 GM/DL  8.5 GM/DL





 (6.4-8.2)  (6.4-8.2)


 


Phenytoin (Dilantin) Level 5.8 MCG/ML  





 (10.0-20.0)  


 


Urine Protein  30 mg/dL 





  (NEG-TRACE) 


 


Urine Occult Blood  TRACE  (NEG) 


 


Urine Mucus  FEW /lpf (OCC) 


 


Urine Barbiturates Screen  POS  (NEG) 


 


Blood Urea Nitrogen   5 MG/DL (7-18)


 


Albumin   3.1 GM/DL





   (3.4-5.0)


 


Phenobarbital Level   9.3 MCG/ML





   (15.0-40.0)








PE at Discharge


Not in distress, well-nourished, looks stated age


PERRL,  pink conjunctiva without injection, anicteric


Nose without bleeding, airway patent, oropharynx clear


Supple neck, no masses or thyromegaly, trachea midline


Normal rate and regular rhythm, no murmurs gallops or rubs appreciated. 


Clear to auscultation and symmetric bilaterally, normal respiratory effort.  


Normal bowel sounds, soft, obese, non-tender, nondistended, no guarding. 


Extremities without clubbing, cyanosis, or edema. 


No rash of generalized distribution.  Skin is warm and dry.


AAO x3, no cranial nerve deficits, moves all 4 extremities, no focal neurologic 

deficits, no meningeal signs.


Normal mood, appropriate affect


Pt update on day of discharge


No overnight events, no seizure episodes, low-grade temperature elevation but 

no fever.  No cough, chills, urinary symptoms or diarrhea.  No neck pain or 

headache.  he wants to go home.  He still says  now that he is more lucid that 

he hasn't taken his phenobarbital for several days because he ran out of it.


Hospital Course





46-year-old male with PMH of hypertension, grand mal seizures on Dilantin/

Phenobarbital, and hx of status epilepticus requiring intubation, presents to 

the ER after a witnessed seizure while at [facility] today 2/19 and again another 

grand mal seizure witnessed in the ER.  Patient was loaded with Cerebyx and 

phenobarbital.  He was restarted on his home dose of Dilantin and 

phenobarbital.  No further seizure episodes.  Seizure secondary to noncompliance

, patient hasn't had his phenobarbital for 3 days because he ran out of it.  

Patient otherwise is stable.  After loading with phenobarbital, patient's 

levels are still low, will give another loading dose prior to discharge.  

Dilantin level is therapeutic.  He will be discharged on his home dose of 

antiseizure medications to follow-up with his primary care physician in one 

week.


Pt Condition on Discharge:  Good


Discharge Disposition:  Discharge Home


Discharge Time:  <= 30 minutes


Discharge Instructions


DIET: Follow Instructions for:  As Tolerated, No Restrictions


Activities you can perform:  Regular-No Restrictions


Follow up Referrals:  


PCP Follow-up - 1 Week





Continued Medications:  


Metoprolol Tartrate (Metoprolol Tartrate) 50 Mg Tab


50 MG PO BID #60 Ref 0 TAB


Phenobarbital (Phenobarbital) 64.8 Mg Tab


97.2 MG PO Q8HR Control Seizures #60 Ref 0 TAB


Phenytoin Extended (Dilantin) 100 Mg Cap


100 MG PO Q8HR #90 Ref 0 CAP











Jose Alejandro Monae MD Feb 20, 2017 13:30

## 2017-12-08 ENCOUNTER — HOSPITAL ENCOUNTER (EMERGENCY)
Dept: HOSPITAL 17 - NEPD | Age: 47
Discharge: HOME | End: 2017-12-08
Payer: MEDICARE

## 2017-12-08 VITALS — BODY MASS INDEX: 42.43 KG/M2 | WEIGHT: 279.99 LBS | HEIGHT: 68 IN

## 2017-12-08 VITALS
HEART RATE: 79 BPM | TEMPERATURE: 98.3 F | DIASTOLIC BLOOD PRESSURE: 105 MMHG | OXYGEN SATURATION: 96 % | SYSTOLIC BLOOD PRESSURE: 180 MMHG | RESPIRATION RATE: 16 BRPM

## 2017-12-08 DIAGNOSIS — Z76.0: Primary | ICD-10-CM

## 2017-12-08 DIAGNOSIS — Z79.899: ICD-10-CM

## 2017-12-08 DIAGNOSIS — I10: ICD-10-CM

## 2017-12-08 DIAGNOSIS — G40.909: ICD-10-CM

## 2017-12-08 PROCEDURE — 99281 EMR DPT VST MAYX REQ PHY/QHP: CPT

## 2017-12-08 NOTE — PD
HPI


Chief Complaint:  Medication Refill Request


Time Seen by Provider:  20:01


Travel History


International Travel<30 days:  No


Contact w/Intl Traveler<30days:  No


Traveled to known affect area:  No





History of Present Illness


HPI


47-year-old black male presents emergency department requesting a refill of his 

phenobarbital.  Patient has history of seizure disorder and hypertension.  He 

states that his doctor had called in his prescription for his blood pressure 

medicine but neglected to call in his phenobarb are.  He's been out for 2 days 

now.  He has not had a seizure here recently.  He denies any fever chills.  No 

chest pain or shortness of breath.





PFSH


Past Medical History


*** Narrative Medical


Seizure disorder, hypertension


Hx Anticoagulant Therapy:  No


Blood Disorders:  No


Heart Rhythm Problems:  No


Cancer:  No


Cardiovascular Problems:  Yes


High Cholesterol:  No


Chemotherapy:  No


Chest Pain:  No


Congestive Heart Failure:  No


Cerebrovascular Accident:  No


Diabetes:  No


Patient Takes Glucophage:  No


Diminished Hearing:  No


Endocrine:  No


Genitourinary:  No


Hypertension:  Yes


Immune Disorder:  No


Musculoskeletal:  No


Neurologic:  Yes


Psychiatric:  No


Reproductive:  No


Respiratory:  No


Migraines:  No


Seizures:  Yes





Past Surgical History


Surgical History:  No Previous Surgery





Social History


Alcohol Use:  No


Tobacco Use:  No


Substance Use:  No





Allergies-Medications


(Allergen,Severity, Reaction):  


Coded Allergies:  


     No Known Allergies (Verified , 2/19/17)


Reported Meds & Prescriptions





Reported Meds & Active Scripts


Active


Phenobarbital 64.8 Mg Tab 64.8 Mg PO Q8HR


Reported


Dilantin (Phenytoin Extended) 100 Mg Cap 100 Mg PO Q8HR


Metoprolol Tartrate 50 Mg Tab 50 Mg PO BID








Review of Systems


General / Constitutional:  No: Fever


Eyes:  No: Visual changes


HENT:  No: Headaches


Cardiovascular:  No: Chest Pain or Discomfort


Respiratory:  No: Shortness of Breath


Gastrointestinal:  No: Abdominal Pain


Genitourinary:  No: Dysuria


Musculoskeletal:  No: Pain


Skin:  No Rash


Neurologic:  No: Weakness


Psychiatric:  No: Depression


Endocrine:  No: Polydipsia


Hematologic/Lymphatic:  No: Easy Bruising





Physical Exam


Narrative


GENERAL: This is a well-nourished, well-developed patient, in no apparent 

distress.


SKIN: No rashes, ecchymoses or lesions. Warm and dry.


HEAD: Atraumatic. Normocephalic.


EYES: PERRL, EOMI, no discharge or injection. No scleral icterus.


EARS: Clear


NOSE: Nasal turbinates appear normal.


THROAT: Mucosa pink and moist.  Airway patent.


NECK: Trachea midline. supple, moves head freely.


LUNGS: Clear to auscultation.


CV: Regular in rhythm.


ABDOMEN: Soft nontender.


EXT: No clubbing cyanosis or edema.





Data


Data


Last Documented VS





Vital Signs








  Date Time  Temp Pulse Resp B/P (MAP) Pulse Ox O2 Delivery O2 Flow Rate FiO2


 


12/8/17 20:08        


 


12/8/17 17:55 98.3 79 16  96 Room Air  











MDM


Medical Decision Making


Medical Screen Exam Complete:  Yes


Emergency Medical Condition:  Yes


Medical Record Reviewed:  Yes


Differential Diagnosis


Differential diagnoses: Med refill, seizure disorder, malingering, noncompliance


Narrative Course


This is medication refill, seizure disorder





Diagnosis





 Primary Impression:  


 Medication refill


 Additional Impression:  


 Seizure disorder


Patient Instructions:  General Instructions





***Additional Instructions:  


Rest.


Medications as directed.


Follow-up your doctor next week.


Return to the ER for emergencies


***Med/Other Pt SpecificInfo:  Prescription(s) given


Scripts


Phenobarbital (Phenobarbital) 64.8 Mg Tab


64.8 MG PO Q8HR for Control Seizures, #60 TAB 0 Refills


   Prov: Adrian Quinonez MD         12/8/17


Disposition:  01 DISCHARGE HOME


Condition:  Stable











Shiva Cruz Dec 8, 2017 20:11

## 2017-12-10 ENCOUNTER — HOSPITAL ENCOUNTER (EMERGENCY)
Dept: HOSPITAL 17 - NEPC | Age: 47
Discharge: HOME | End: 2017-12-10
Payer: MEDICARE

## 2017-12-10 VITALS — WEIGHT: 315 LBS | BODY MASS INDEX: 42.66 KG/M2 | HEIGHT: 72 IN

## 2017-12-10 VITALS
SYSTOLIC BLOOD PRESSURE: 121 MMHG | OXYGEN SATURATION: 98 % | HEART RATE: 110 BPM | DIASTOLIC BLOOD PRESSURE: 65 MMHG | RESPIRATION RATE: 14 BRPM | TEMPERATURE: 97.6 F

## 2017-12-10 VITALS — OXYGEN SATURATION: 97 %

## 2017-12-10 DIAGNOSIS — I10: ICD-10-CM

## 2017-12-10 DIAGNOSIS — G40.909: Primary | ICD-10-CM

## 2017-12-10 DIAGNOSIS — R94.31: ICD-10-CM

## 2017-12-10 DIAGNOSIS — R11.10: ICD-10-CM

## 2017-12-10 DIAGNOSIS — R00.0: ICD-10-CM

## 2017-12-10 DIAGNOSIS — Z79.899: ICD-10-CM

## 2017-12-10 LAB
ANION GAP SERPL CALC-SCNC: 20 MEQ/L (ref 5–15)
BASOPHILS # BLD AUTO: 0.2 TH/MM3 (ref 0–0.2)
BASOPHILS NFR BLD: 0.9 % (ref 0–2)
BUN SERPL-MCNC: 12 MG/DL (ref 7–18)
CHLORIDE SERPL-SCNC: 103 MEQ/L (ref 98–107)
EOSINOPHIL # BLD: 0.3 TH/MM3 (ref 0–0.4)
EOSINOPHIL NFR BLD: 1.9 % (ref 0–4)
EOSINOPHIL NFR BLD: 2 % (ref 0–4)
ERYTHROCYTE [DISTWIDTH] IN BLOOD BY AUTOMATED COUNT: 14.4 % (ref 11.6–17.2)
ETHANOL SERPL-MCNC: (no result) MG/DL (ref 0–5)
GFR SERPLBLD BASED ON 1.73 SQ M-ARVRAT: 84 ML/MIN (ref 89–?)
HCO3 BLD-SCNC: 15.2 MEQ/L (ref 21–32)
HCT VFR BLD CALC: 44.4 % (ref 39–51)
HEMO FLAGS: (no result)
LYMPHOCYTES # BLD AUTO: 6.8 TH/MM3 (ref 1–4.8)
LYMPHOCYTES NFR BLD AUTO: 39 % (ref 9–44)
MCH RBC QN AUTO: 27.3 PG (ref 27–34)
MCHC RBC AUTO-ENTMCNC: 33.8 % (ref 32–36)
MCV RBC AUTO: 80.9 FL (ref 80–100)
MONOCYTES NFR BLD: 14 % (ref 0–8)
NEUTROPHILS # BLD AUTO: 7.7 TH/MM3 (ref 1.8–7.7)
NEUTROPHILS NFR BLD AUTO: 44.2 % (ref 16–70)
NEUTS BAND # BLD MANUAL: 5.8 TH/MM3 (ref 1.8–7.7)
NEUTS BAND NFR BLD: 2 % (ref 0–6)
NEUTS SEG NFR BLD MANUAL: 31 % (ref 16–70)
PHENOBARB SERPL-MCNC: 10.5 MCG/ML (ref 15–40)
PLAT MORPH BLD: NORMAL
PLATELET # BLD: 244 TH/MM3 (ref 150–450)
PLATELET BLD QL SMEAR: NORMAL
POTASSIUM SERPL-SCNC: 3.8 MEQ/L (ref 3.5–5.1)
RBC # BLD AUTO: 5.49 MIL/MM3 (ref 4.5–5.9)
SCAN/DIFF: (no result)
SODIUM SERPL-SCNC: 138 MEQ/L (ref 136–145)
WBC # BLD AUTO: 17.5 TH/MM3 (ref 4–11)
WBC DIFF SAMPLE: 100

## 2017-12-10 PROCEDURE — 96374 THER/PROPH/DIAG INJ IV PUSH: CPT

## 2017-12-10 PROCEDURE — 80184 ASSAY OF PHENOBARBITAL: CPT

## 2017-12-10 PROCEDURE — 80307 DRUG TEST PRSMV CHEM ANLYZR: CPT

## 2017-12-10 PROCEDURE — 93005 ELECTROCARDIOGRAM TRACING: CPT

## 2017-12-10 PROCEDURE — 85007 BL SMEAR W/DIFF WBC COUNT: CPT

## 2017-12-10 PROCEDURE — 85027 COMPLETE CBC AUTOMATED: CPT

## 2017-12-10 PROCEDURE — 80048 BASIC METABOLIC PNL TOTAL CA: CPT

## 2017-12-10 PROCEDURE — 99285 EMERGENCY DEPT VISIT HI MDM: CPT

## 2017-12-10 PROCEDURE — 80185 ASSAY OF PHENYTOIN TOTAL: CPT

## 2017-12-10 PROCEDURE — 70450 CT HEAD/BRAIN W/O DYE: CPT

## 2017-12-10 NOTE — PD
HPI


Chief Complaint:  Seizure


Time Seen by Provider:  00:37


Travel History


International Travel<30 days:  No


Contact w/Intl Traveler<30days:  No


Traveled to known affect area:  No





History of Present Illness


HPI


47-year-old male presents to the emergency department for evaluation of 

seizure.  Patient has history of seizure disorder and is prescribed Dilantin 

and phenobarbital.  Patient has history of noncompliance.  Patient was just 

seen in the emergency department 12/8/17 for prescription refill on his 

phenobarbital.  Patient also has history of hypertension.  According to EMS 

report patient was picked up in the backseat of a friend of the patient after 

having a witnessed generalized tonic-clonic seizure.  Patient was found to be 

postictal and en route to the hospital started to show evidence of resolving 

his postictal state and then just prior to arrival to the emergency department 

patient had a 30-45 second generalized tonic clonic seizure with urinary 

incontinence.  Patient had IV access at the time therefore the paramedics 

administered 2 mg of Ativan with cessation of seizure activity.  Patient did 

have episode of vomiting according to the paramedics but area is suctioned and 

no concern for aspiration was reported.  Blood sugar was 120.  Patient 

presented with snoring respirations after Ativan and no active seizure.  

Paramedics report there was no injury while the patient was in their care and 

seizure that occurred in the car was not associated with any witnessed injury.





PFSH


Past Medical History


*** Narrative Medical


Seizure disorder medication noncompliance hypertension no tobacco use no 

alcohol use no substance use; nursing notes reviewed


Hx Anticoagulant Therapy:  No


Blood Disorders:  No


Heart Rhythm Problems:  No


Cancer:  No


Cardiovascular Problems:  Yes


High Cholesterol:  No


Chemotherapy:  No


Chest Pain:  No


Congestive Heart Failure:  No


Cerebrovascular Accident:  No


Diabetes:  No


Diminished Hearing:  No


Endocrine:  No


Genitourinary:  No


Hypertension:  Yes


Immune Disorder:  No


Musculoskeletal:  No


Neurologic:  Yes


Psychiatric:  No


Reproductive:  No


Respiratory:  No


Migraines:  No


Seizures:  Yes





Social History


Alcohol Use:  No


Tobacco Use:  No


Substance Use:  No





Allergies-Medications


(Allergen,Severity, Reaction):  


Coded Allergies:  


     No Known Allergies (Verified , 2/19/17)


Reported Meds & Prescriptions





Reported Meds & Active Scripts


Active


Phenobarbital 64.8 Mg Tab 64.8 Mg PO Q8HR


Reported


Dilantin (Phenytoin Extended) 100 Mg Cap 100 Mg PO Q8HR


Metoprolol Tartrate 50 Mg Tab 50 Mg PO BID








Review of Systems


ROS Limitations:  Clinical Condition (postictal), Altered Mental Status (

postictal)


Except as stated in HPI:  all other systems reviewed are Neg





Physical Exam


Narrative


GENERAL: Well-developed well-nourished male with snoring respirations status 

post seizure and Ativan 2 mg IV


SKIN: Warm and dry.  No abrasions


HEAD: Normocephalic.  Atraumatic.


EYES: No scleral icterus.  Pupils equal round reactive to light No injection or 

drainage. 


NECK: Supple, trachea midline. No JVD or lymphadenopathy.


CARDIOVASCULAR: Increased Regular rate and rhythm without murmurs, gallops, or 

rubs. 


RESPIRATORY: Breath sounds equal bilaterally. No accessory muscle use.


GASTROINTESTINAL: Abdomen soft, non-tender, nondistended. 


MUSCULOSKELETAL: No cyanosis, or edema. 


BACK: Nontender without obvious deformity. No CVA tenderness.








Data


Data


Last Documented VS





Vital Signs








  Date Time  Temp Pulse Resp B/P (MAP) Pulse Ox O2 Delivery O2 Flow Rate FiO2


 


12/10/17 00:41     97 Room Air  


 


12/10/17 00:34 97.6 110 14 121/65 (83)    








Orders





 Orders


Complete Blood Count With Diff (12/10/17 00:38)


Basic Metabolic Panel (Bmp) (12/10/17 00:38)


Alcohol (Ethanol) (12/10/17 00:38)


Phenytoin (Dilantin) (12/10/17 00:38)


Phenobarbital (12/10/17 00:38)


Drug Screen, Random Urine (12/10/17 00:38)


Electrocardiogram (12/10/17 )


Ct Brain W/O Iv Contrast(Rout) (12/10/17 )


Blood Glucose (12/10/17 00:38)


Ecg Monitoring (12/10/17 00:38)


Iv Access Insert/Monitor (12/10/17 00:38)


Oximetry (12/10/17 00:38)


Sodium Chloride 0.9% Flush (Ns Flush) (12/10/17 00:45)


Urinalysis - C+S If Indicated (12/10/17 00:38)


Sodium Chlor 0.9% 1000 Ml Inj (Ns 1000 M (12/10/17 00:45)


Fosphenytoin Inj (Cerebyx Inj) (12/10/17 03:00)


Phenobarbital (Phenobarbital) (12/10/17 03:00)





Labs





Laboratory Tests








Test


  12/10/17


00:45


 


White Blood Count 17.5 TH/MM3 


 


Red Blood Count 5.49 MIL/MM3 


 


Hemoglobin 15.0 GM/DL 


 


Hematocrit 44.4 % 


 


Mean Corpuscular Volume 80.9 FL 


 


Mean Corpuscular Hemoglobin 27.3 PG 


 


Mean Corpuscular Hemoglobin


Concent 33.8 % 


 


 


Red Cell Distribution Width 14.4 % 


 


Platelet Count 244 TH/MM3 


 


Mean Platelet Volume 8.6 FL 


 


Neutrophils (%) (Auto) 44.2 % 


 


Lymphocytes (%) (Auto) 39.0 % 


 


Monocytes (%) (Auto) 14.0 % 


 


Eosinophils (%) (Auto) 1.9 % 


 


Basophils (%) (Auto) 0.9 % 


 


Neutrophils # (Auto) 7.7 TH/MM3 


 


Lymphocytes # (Auto) 6.8 TH/MM3 


 


Monocytes # (Auto) 2.4 TH/MM3 


 


Eosinophils # (Auto) 0.3 TH/MM3 


 


Basophils # (Auto) 0.2 TH/MM3 


 


CBC Comment AUTO DIFF 


 


Differential Total Cells


Counted 100 


 


 


Neutrophils % (Manual) 31 % 


 


Band Neutrophils % 2 % 


 


Lymphocytes % 50 % 


 


Monocytes % 15 % 


 


Eosinophils % 2 % 


 


Neutrophils # (Manual) 5.8 TH/MM3 


 


Differential Comment


  FINAL DIFF


MANUAL


 


Platelet Estimate NORMAL 


 


Platelet Morphology Comment NORMAL 


 


Blood Urea Nitrogen 12 MG/DL 


 


Creatinine 1.12 MG/DL 


 


Random Glucose 135 MG/DL 


 


Calcium Level 9.0 MG/DL 


 


Sodium Level 138 MEQ/L 


 


Potassium Level 3.8 MEQ/L 


 


Chloride Level 103 MEQ/L 


 


Carbon Dioxide Level 15.2 MEQ/L 


 


Anion Gap 20 MEQ/L 


 


Estimat Glomerular Filtration


Rate 84 ML/MIN 


 


 


Phenytoin (Dilantin) Level 3.0 MCG/ML 


 


Phenobarbital Level 10.5 MCG/ML 


 


Ethyl Alcohol Level


  LESS THAN 3


MG/DL











[facility]


Medical Decision Making


Medical Screen Exam Complete:  Yes


Emergency Medical Condition:  Yes


Medical Record Reviewed:  Yes


Interpretation(s)


EKG: Sinus tachycardia rate 110 nonspecific ST-T wave changes no acute ST 

elevation


Dilantin level: 3, subtherapeutic


Phenobarbital level: 10.5 subtherapeutic


Serum alcohol level: Less than 3, not elevated


CBC was automated differential leukocytosis of 17,000 with right shift 50% 

lymphocytes


Metabolic panel bicarbonate is 15.9 consistent with 2 witnessed seizures.


CBC & BMP Diagram


12/10/17 00:45








Calcium Level 9.0








Vital Signs








  Date Time  Temp Pulse Resp B/P (MAP) Pulse Ox O2 Delivery O2 Flow Rate FiO2


 


12/10/17 00:41     97 Room Air  


 


12/10/17 00:34 97.6 110 14 121/65 (83) 98   





CT brain noncontrast reading per radiologist Dr. Slim Waters there is mucosal 

thickening in the maxillary sinuses normal appearance of the brain


Differential Diagnosis


Seizure, breakthrough seizure, status epilepticus, noncompliance, substance 

ingestion, TIA, CVA, uncontrolled hypertension


Narrative Course


Patient placed on cardiac monitor with continuous pulse oximetry nasal trumpet/

nasopharyngeal airway inserted to the right nostril without difficulty patient 

placed on supplemental oxygen with nasal cannula seizure precaution padding 

administered and patient also given one-time dose of Zofran 4 mg IV as episode 

of vomiting witnessed by paramedics prior to arrival to the emergency 

department patient has just received 2 mg of Ativan IV by EMS prior to arrival 

to the emergency department.  Specimens collected and sent for resulting EKG 

performed shows sinus tachycardia no acute injury pattern





At 3:30 AM [facility] 15 patient reports he has plenty of Dilantin a home did fill his 

prescription for phenobarbital that he received yesterday and didn't take the 

medication but did not take her evening dose and has plenty of metoprolol at 

home.  Patient states that he does at times have issues with compliance that 

she runs out of the medication and has difficulty getting the medication 

filled.  Patient denies other complaints.  No recent illness no recent injury.





Diagnosis





 Primary Impression:  


 Recurrent seizures


 Additional Impression:  


 Subtherapeutic serum dilantin level


Referrals:  


Primary Care Physician


call for appointment


Patient Instructions:  General Instructions


Disposition:  01 DISCHARGE HOME


Condition:  Stable











Paige Foley MD Dec 10, 2017 00:43

## 2017-12-10 NOTE — EKG
Date Performed: 12/10/2017       Time Performed: 00:38:02

 

PTAGE:      47 years

 

EKG:      SINUS TACHYCARDIA POSSIBLE LEFT ATRIAL ENLARGEMENT NONSPECIFIC ST & T-WAVE ABNORMALITY ABNO
RMAL RHYTHM ECG Compared to 

 

 PREVIOUS TRACING            , the ST & T changes are somewhat more prominent, otherwise no significa
nt change. PREVIOUS TRACIN2017 11.36

 

DOCTOR:   Harlan Antonio  Interpretating Date/Time  12/10/2017 12:28:38

## 2017-12-10 NOTE — RADRPT
EXAM DATE/TIME:  12/10/2017 04:22 

 

[facility] COMPARISON:     

CT BRAIN W/O CONTRAST, February 04, 2017, 23:39.

 

 

INDICATIONS :     

Altered mental status; possible seizure.

                      

 

RADIATION DOSE:     

56.35 CTDIvol (mGy) 

 

 

 

MEDICAL HISTORY :     

Seizures. Hypertension. Cardiovascular disease

 

SURGICAL HISTORY :      

None. 

 

ENCOUNTER:      

Initial

 

ACUITY:      

1 day

 

PAIN SCALE:      

0/10

 

LOCATION:        

cranial 

 

TECHNIQUE:     

Multiple contiguous axial images were obtained of the head.  Using automated exposure control and adj
ustment of the mA and/or kV according to patient size, radiation dose was kept as low as reasonably a
chievable to obtain optimal diagnostic quality images.   DICOM format image data is available electro
nically for review and comparison.  

 

FINDINGS:     

 

CEREBRUM:     

The ventricles are normal for age.  No evidence of midline shift, mass lesion, hemorrhage or acute in
farction.  No extra-axial fluid collections are seen.

 

POSTERIOR FOSSA:     

The cerebellum and brainstem are intact.  The 4th ventricle is midline.  The cerebellopontine angle i
s unremarkable.

 

EXTRACRANIAL:     

The visualized portion of the orbits is intact. Bilateral maxillary sinus mucosal thickening.

 

SKULL:     

The calvaria is intact.  No evidence of skull fracture.

 

CONCLUSION:     

1. There is mucosal thickening in the maxillary sinuses.

2. Normal appearance of the brain.

 

 

 

 Slim Waters MD on December 10, 2017 at 5:16           

Board Certified Radiologist.

 This report was verified electronically.

## 2018-05-27 ENCOUNTER — HOSPITAL ENCOUNTER (EMERGENCY)
Dept: HOSPITAL 17 - NEPC | Age: 48
Discharge: HOME | End: 2018-05-27
Payer: MEDICARE

## 2018-05-27 VITALS
HEART RATE: 97 BPM | RESPIRATION RATE: 22 BRPM | DIASTOLIC BLOOD PRESSURE: 80 MMHG | SYSTOLIC BLOOD PRESSURE: 146 MMHG | OXYGEN SATURATION: 92 % | TEMPERATURE: 98.9 F

## 2018-05-27 VITALS
DIASTOLIC BLOOD PRESSURE: 70 MMHG | HEART RATE: 98 BPM | SYSTOLIC BLOOD PRESSURE: 155 MMHG | RESPIRATION RATE: 18 BRPM | OXYGEN SATURATION: 97 %

## 2018-05-27 VITALS — RESPIRATION RATE: 24 BRPM | OXYGEN SATURATION: 95 %

## 2018-05-27 VITALS
HEART RATE: 86 BPM | RESPIRATION RATE: 18 BRPM | DIASTOLIC BLOOD PRESSURE: 76 MMHG | SYSTOLIC BLOOD PRESSURE: 160 MMHG | OXYGEN SATURATION: 97 %

## 2018-05-27 VITALS
HEART RATE: 97 BPM | OXYGEN SATURATION: 96 % | SYSTOLIC BLOOD PRESSURE: 148 MMHG | DIASTOLIC BLOOD PRESSURE: 79 MMHG | RESPIRATION RATE: 18 BRPM

## 2018-05-27 VITALS — HEIGHT: 72 IN | WEIGHT: 253.53 LBS | BODY MASS INDEX: 34.34 KG/M2

## 2018-05-27 DIAGNOSIS — R56.9: Primary | ICD-10-CM

## 2018-05-27 DIAGNOSIS — I10: ICD-10-CM

## 2018-05-27 LAB
ALBUMIN SERPL-MCNC: 3.4 GM/DL (ref 3.4–5)
ALP SERPL-CCNC: 153 U/L (ref 45–117)
ALT SERPL-CCNC: 53 U/L (ref 12–78)
AST SERPL-CCNC: 55 U/L (ref 15–37)
BASOPHILS # BLD AUTO: 0 TH/MM3 (ref 0–0.2)
BASOPHILS NFR BLD: 0.7 % (ref 0–2)
BILIRUB SERPL-MCNC: 0.4 MG/DL (ref 0.2–1)
BUN SERPL-MCNC: 8 MG/DL (ref 7–18)
CALCIUM SERPL-MCNC: 8.2 MG/DL (ref 8.5–10.1)
CARBAMAZEPINE (TEGRETOL): (no result) MCG/ML (ref 4–12)
CHLORIDE SERPL-SCNC: 100 MEQ/L (ref 98–107)
COLOR UR: YELLOW
CREAT SERPL-MCNC: 0.93 MG/DL (ref 0.6–1.3)
EOSINOPHIL # BLD: 0.2 TH/MM3 (ref 0–0.4)
EOSINOPHIL NFR BLD: 2.2 % (ref 0–4)
ERYTHROCYTE [DISTWIDTH] IN BLOOD BY AUTOMATED COUNT: 14.8 % (ref 11.6–17.2)
GFR SERPLBLD BASED ON 1.73 SQ M-ARVRAT: 104 ML/MIN (ref 89–?)
GLUCOSE SERPL-MCNC: 107 MG/DL (ref 74–106)
GLUCOSE UR STRIP-MCNC: (no result) MG/DL
HCO3 BLD-SCNC: 19.8 MEQ/L (ref 21–32)
HCT VFR BLD CALC: 41 % (ref 39–51)
HGB BLD-MCNC: 14.5 GM/DL (ref 13–17)
HGB UR QL STRIP: (no result)
HYALINE CASTS #/AREA URNS LPF: 3 /LPF
KETONES UR STRIP-MCNC: (no result) MG/DL
LYMPHOCYTES # BLD AUTO: 1.8 TH/MM3 (ref 1–4.8)
LYMPHOCYTES NFR BLD AUTO: 25 % (ref 9–44)
MCH RBC QN AUTO: 27.1 PG (ref 27–34)
MCHC RBC AUTO-ENTMCNC: 35.5 % (ref 32–36)
MCV RBC AUTO: 76.3 FL (ref 80–100)
MONOCYTE #: 0.9 TH/MM3 (ref 0–0.9)
MONOCYTES NFR BLD: 11.9 % (ref 0–8)
NEUTROPHILS # BLD AUTO: 4.3 TH/MM3 (ref 1.8–7.7)
NEUTROPHILS NFR BLD AUTO: 60.2 % (ref 16–70)
NITRITE UR QL STRIP: (no result)
PHENYTOIN (DILANTIN): 2.7 MCG/ML (ref 10–20)
PLATELET # BLD: 213 TH/MM3 (ref 150–450)
PMV BLD AUTO: 8.7 FL (ref 7–11)
PROT SERPL-MCNC: 8.9 GM/DL (ref 6.4–8.2)
RBC # BLD AUTO: 5.37 MIL/MM3 (ref 4.5–5.9)
SODIUM SERPL-SCNC: 136 MEQ/L (ref 136–145)
SP GR UR STRIP: 1.01 (ref 1–1.03)
URINE LEUKOCYTE ESTERASE: (no result)
WBC # BLD AUTO: 7.2 TH/MM3 (ref 4–11)

## 2018-05-27 PROCEDURE — 80184 ASSAY OF PHENOBARBITAL: CPT

## 2018-05-27 PROCEDURE — 81001 URINALYSIS AUTO W/SCOPE: CPT

## 2018-05-27 PROCEDURE — 99284 EMERGENCY DEPT VISIT MOD MDM: CPT

## 2018-05-27 PROCEDURE — 93005 ELECTROCARDIOGRAM TRACING: CPT

## 2018-05-27 PROCEDURE — 80164 ASSAY DIPROPYLACETIC ACD TOT: CPT

## 2018-05-27 PROCEDURE — 80053 COMPREHEN METABOLIC PANEL: CPT

## 2018-05-27 PROCEDURE — 80185 ASSAY OF PHENYTOIN TOTAL: CPT

## 2018-05-27 PROCEDURE — 80307 DRUG TEST PRSMV CHEM ANLYZR: CPT

## 2018-05-27 PROCEDURE — 80156 ASSAY CARBAMAZEPINE TOTAL: CPT

## 2018-05-27 PROCEDURE — 70450 CT HEAD/BRAIN W/O DYE: CPT

## 2018-05-27 PROCEDURE — 85025 COMPLETE CBC W/AUTO DIFF WBC: CPT

## 2018-05-27 NOTE — PD
HPI


Chief Complaint:  Seizure


Time Seen by Provider:  00:19


Travel History


International Travel<30 days:  No (ABI)


Contact w/Intl Traveler<30days:  No (ABI)


Traveled to known affect area:  No (ABI)





History of Present Illness


HPI


48-year-old male with history of seizures has recurrent seizure witnessed by 

friends in a parking lot.


Patient arrives postictal after receiving 2 of Versed prehospital.


He provides no history at this time





PFSH


Past Medical History


Hx Anticoagulant Therapy:  No


Blood Disorders:  No


Heart Rhythm Problems:  No


Cancer:  No


Cardiovascular Problems:  Yes


High Cholesterol:  No


Chemotherapy:  No


Chest Pain:  No


Congestive Heart Failure:  No


Cerebrovascular Accident:  No


Diabetes:  No


Diminished Hearing:  No


Endocrine:  No


Gastrointestinal Disorders:  No


Genitourinary:  No


Headaches:  No


Hypertension:  Yes


Immune Disorder:  No


Implanted Vascular Access Dvce:  No


Musculoskeletal:  No


Neurologic:  Yes


Psychiatric:  No


Reproductive:  No


Respiratory:  No


Migraines:  No


Seizures:  Yes





Social History


Alcohol Use:  No (ABI)


Tobacco Use:  No (ABI)


Substance Use:  No (ABI)





Allergies-Medications


(Allergen,Severity, Reaction):  


Coded Allergies:  


     No Known Allergies (Verified , 2/19/17)


Reported Meds & Prescriptions





Reported Meds & Active Scripts


Active


Phenobarbital 64.8 Mg Tab 64.8 Mg PO Q8HR


Reported


Keppra (Levetiracetam) 750 Mg Tab 750 Mg PO BID


Dilantin (Phenytoin Extended) 100 Mg Cap 100 Mg PO Q8HR


Metoprolol Tartrate 50 Mg Tab 50 Mg PO BID








Review of Systems


ROS Limitations:  Altered Mental Status





Physical Exam


Narrative


GENERAL: 48-year-old male who is postictal


SKIN: Focused skin assessment warm/dry.


HEAD: Lip trauma from biting, nothing suturable


EYES: Pupils equal and round. No scleral icterus. No injection or drainage. 


ENT: No nasal bleeding or discharge.  Mucous membranes pink and moist.


NECK: Trachea midline. 


CARDIOVASCULAR: Regular rate and rhythm.  No murmur appreciated.


RESPIRATORY: No accessory muscle use. Clear to auscultation. Breath sounds 

equal bilaterally. 


GASTROINTESTINAL: Abdomen soft, non-tender, nondistended. Hepatic and splenic 

margins not palpable. 


MUSCULOSKELETAL: No obvious injury or deformity. 


NEUROLOGICAL: Postictal answers "yup" to all questions.





Data


Data


Last Documented VS





Vital Signs








  Date Time  Temp Pulse Resp B/P (MAP) Pulse Ox O2 Delivery O2 Flow Rate FiO2


 


5/27/18 03:02  98 18 155/70 (98) 97 Room Air  


 


5/27/18 01:50       2.00 


 


5/27/18 00:19 98.9       








Orders





 Orders


Complete Blood Count With Diff (5/27/18 00:19)


Alcohol (Ethanol) (5/27/18 00:19)


Phenytoin (Dilantin) (5/27/18 00:19)


Carbamazepine (Tegretol) (5/27/18 00:19)


Phenobarbital (5/27/18 00:19)


Valproic Acid (Depakene) (5/27/18 00:19)


Drug Screen, Random Urine (5/27/18 00:19)


Blood Glucose (5/27/18 00:19)


Ecg Monitoring (5/27/18 00:19)


Iv Access Insert/Monitor (5/27/18 00:19)


Oximetry (5/27/18 00:19)


Comprehensive Metabolic Panel (5/27/18 00:19)


Sodium Chloride 0.9% Flush (Ns Flush) (5/27/18 00:30)


Urinalysis - C+S If Indicated (5/27/18 00:19)


Ct Brain W/O Iv Contrast(Rout) (5/27/18 )


Valproic Acid (Depakene) (5/27/18 05:00)





Labs





Laboratory Tests








Test


  5/27/18


00:22 5/27/18


01:36


 


White Blood Count 7.2 TH/MM3  


 


Red Blood Count 5.37 MIL/MM3  


 


Hemoglobin 14.5 GM/DL  


 


Hematocrit 41.0 %  


 


Mean Corpuscular Volume 76.3 FL  


 


Mean Corpuscular Hemoglobin 27.1 PG  


 


Mean Corpuscular Hemoglobin


Concent 35.5 % 


  


 


 


Red Cell Distribution Width 14.8 %  


 


Platelet Count 213 TH/MM3  


 


Mean Platelet Volume 8.7 FL  


 


Neutrophils (%) (Auto) 60.2 %  


 


Lymphocytes (%) (Auto) 25.0 %  


 


Monocytes (%) (Auto) 11.9 %  


 


Eosinophils (%) (Auto) 2.2 %  


 


Basophils (%) (Auto) 0.7 %  


 


Neutrophils # (Auto) 4.3 TH/MM3  


 


Lymphocytes # (Auto) 1.8 TH/MM3  


 


Monocytes # (Auto) 0.9 TH/MM3  


 


Eosinophils # (Auto) 0.2 TH/MM3  


 


Basophils # (Auto) 0.0 TH/MM3  


 


CBC Comment DIFF FINAL  


 


Differential Comment   


 


Blood Urea Nitrogen 8 MG/DL  


 


Creatinine 0.93 MG/DL  


 


Random Glucose 107 MG/DL  


 


Total Protein 8.9 GM/DL  


 


Albumin 3.4 GM/DL  


 


Calcium Level 8.2 MG/DL  


 


Alkaline Phosphatase 153 U/L  


 


Aspartate Amino Transf


(AST/SGOT) 55 U/L 


  


 


 


Alanine Aminotransferase


(ALT/SGPT) 53 U/L 


  


 


 


Total Bilirubin 0.4 MG/DL  


 


Sodium Level 136 MEQ/L  


 


Potassium Level 3.4 MEQ/L  


 


Chloride Level 100 MEQ/L  


 


Carbon Dioxide Level 19.8 MEQ/L  


 


Anion Gap 16 MEQ/L  


 


Estimat Glomerular Filtration


Rate 104 ML/MIN 


  


 


 


Phenytoin (Dilantin) Level 2.7 MCG/ML  


 


Valproic Acid (Depakene) Level 4 MCG/ML  


 


Carbamazepine (Tegretol) Level


  LESS THAN 0.5


MCG/ML 


 


 


Phenobarbital Level 19.7 MCG/ML  


 


Ethyl Alcohol Level


  LESS THAN 3


MG/DL 


 


 


Urine Color  YELLOW 


 


Urine Turbidity  CLEAR 


 


Urine pH  6.0 


 


Urine Specific Gravity  1.011 


 


Urine Protein  30 mg/dL 


 


Urine Glucose (UA)  NEG mg/dL 


 


Urine Ketones  TRACE mg/dL 


 


Urine Occult Blood  NEG 


 


Urine Nitrite  NEG 


 


Urine Bilirubin  NEG 


 


Urine Urobilinogen


  


  LESS THAN 2.0


MG/DL


 


Urine Leukocyte Esterase  NEG 


 


Urine RBC


  


  LESS THAN 1


/hpf


 


Urine WBC


  


  LESS THAN 1


/hpf


 


Urine Hyaline Casts  3 /lpf 


 


Microscopic Urinalysis Comment


  


  CULT NOT


INDICATED


 


Urine Opiates Screen  NEG 


 


Urine Barbiturates Screen  POS 


 


Urine Amphetamines Screen  NEG 


 


Urine Benzodiazepines Screen  POS 


 


Urine Cocaine Screen  NEG 


 


Urine Cannabinoids Screen  NEG 











MDM


Medical Decision Making


Medical Screen Exam Complete:  Yes


Emergency Medical Condition:  Yes


Differential Diagnosis


Recurrent seizure


Narrative Course


Patient was seen and evaluated in the emergency department.


Initially provided no history and was unable to speak clearly.


Within 30-50 minutes he began answering all questions and provided medications 

as well.


CT was negative.


His drug levels found to be subtherapeutic and the patient admits to not taking 

his dose this evening, and possibly yesterday.


Patient was given a dose of valproic acid and asked to take his medications 

upon arrival home


He was discharged home.





Diagnosis





 Primary Impression:  


 Seizure


Patient Instructions:  General Instructions, Recurrent Seizures in Adults (ED)


Disposition:  01 DISCHARGE HOME


Condition:  TED Haddad DO May 27, 2018 04:50

## 2018-05-27 NOTE — RADRPT
EXAM DATE:  5/27/2018 2:13 AM EDT

AGE/SEX:        48 years / Male



INDICATIONS:  Seizures with altered mental status.



CLINICAL DATA:  This is the patient's initial encounter. Patient reports that signs and symptoms have
 been present for 1 day and indicates a pain score of 0/10. 

                                                                          

MEDICAL/SURGICAL HISTORY:   Hypertension. Seizures  None.



RADIATION DOSE:  56.40 CTDI (mGy) ;Tabletop exam







COMPARISON:      [facility] [place], CT BRAIN W/O CONTRAST, 12/10/2017.  .





TECHNIQUE:  CT of the head without contrast.  Using automated exposure control and adjustment of the 
mA and/or kV according to patient size, radiation dose was kept as low as reasonably achievable to ob
tain optimal diagnostic quality images.



FINDINGS: 

The Cerebrum:  The ventricles are normal for age.  No evidence of midline shift, mass lesion, hemorrh
age or acute infarction.  No extraaxial fluid collections are seen.

Posterior Fossa:  The cerebellum and brainstem are intact.  The 4th ventricle is midline.  The cerebe
llopontine angle is unremarkable.

Extracranial:  Mucoperiosteal thickening seen of the maxillary air cells, improved on the right worse
 on the left.

Skull:  The calvaria is intact.  No evidence of skull fracture.



CONCLUSION:

1.  No acute intracranial abnormality demonstrated.

2.  Chronic bilateral maxillary sinus disease.



Electronically signed by: Hernán Vidal MD  5/27/2018 2:23 AM EDT

## 2018-05-27 NOTE — EKG
Date Performed: 2018       Time Performed: 00:19:51

 

PTAGE:      48 years

 

EKG:      Sinus rhythm 

 

 POSSIBLE LEFT ATRIAL ENLARGEMENT NONSPECIFIC T-WAVE ABNORMALITY BORDERLINE ECG Compared to 

 

 PREVIOUS TRACING            , there is some improvement in the ST-T changes and the heart rate is sl
ower. PREVIOUS TRACIN/10/2017 00.38.02

 

DOCTOR:   Harlan Antonio  Interpretating Date/Time  2018 13:29:11